# Patient Record
Sex: FEMALE | URBAN - METROPOLITAN AREA
[De-identification: names, ages, dates, MRNs, and addresses within clinical notes are randomized per-mention and may not be internally consistent; named-entity substitution may affect disease eponyms.]

---

## 2017-03-03 ENCOUNTER — APPOINTMENT (OUTPATIENT)
Age: 22
Setting detail: DERMATOLOGY
End: 2017-03-06

## 2017-03-03 DIAGNOSIS — L60.8 OTHER NAIL DISORDERS: ICD-10-CM

## 2017-03-03 PROCEDURE — 99202 OFFICE O/P NEW SF 15 MIN: CPT

## 2017-03-03 PROCEDURE — OTHER COUNSELING: OTHER

## 2017-03-03 PROCEDURE — OTHER OTHER: OTHER

## 2017-03-03 ASSESSMENT — LOCATION DETAILED DESCRIPTION DERM: LOCATION DETAILED: RIGHT RING FINGERNAIL

## 2017-03-03 ASSESSMENT — LOCATION SIMPLE DESCRIPTION DERM: LOCATION SIMPLE: RIGHT RING FINGERNAIL

## 2017-03-03 ASSESSMENT — LOCATION ZONE DERM: LOCATION ZONE: FINGERNAIL

## 2017-03-03 NOTE — PROCEDURE: OTHER
Detail Level: Zone
Note Text (......Xxx Chief Complaint.): This diagnosis correlates with the
Other (Free Text): Refer to Dr. Chavez or Dr. Juan Carlos Lazcano for nail biopsy

## 2017-03-03 NOTE — HPI: NAIL DYSTROPHY
How Severe Is It?: mild
Is This A New Presentation, Or A Follow-Up?: Nail Dystrophy
Additional History: Pt states previous dermatologist Dr. Ann referred her to our office, pt states he wasn't able to do Bx. Patient states it started as thin line a year ago and now is bigger.

## 2017-03-30 ENCOUNTER — APPOINTMENT (OUTPATIENT)
Age: 22
Setting detail: DERMATOLOGY
End: 2017-04-03

## 2017-03-30 DIAGNOSIS — D485 NEOPLASM OF UNCERTAIN BEHAVIOR OF SKIN: ICD-10-CM

## 2017-03-30 PROBLEM — D48.5 NEOPLASM OF UNCERTAIN BEHAVIOR OF SKIN: Status: ACTIVE | Noted: 2017-03-30

## 2017-03-30 PROCEDURE — OTHER OTHER: OTHER

## 2017-03-30 PROCEDURE — OTHER COUNSELING: OTHER

## 2017-03-30 PROCEDURE — OTHER PRESCRIPTION: OTHER

## 2017-03-30 PROCEDURE — 11755 BIOPSY NAIL UNIT: CPT

## 2017-03-30 PROCEDURE — OTHER NAIL UNIT BIOPSY: OTHER

## 2017-03-30 RX ORDER — AZITHROMYCIN DIHYDRATE 250 MG/1
TABLET, FILM COATED ORAL QD
Qty: 1 | Refills: 1 | Status: ERX | COMMUNITY
Start: 2017-03-30

## 2017-03-30 RX ORDER — HYDROCODONE BITATRATE AND ACETAMINOPHEN 5; 325 MG/1; MG/1
TABLET ORAL QD
Qty: 20 | Refills: 0 | COMMUNITY
Start: 2017-03-30

## 2017-03-30 RX ORDER — MUPIROCIN 20 MG/G
OINTMENT TOPICAL BID
Qty: 1 | Refills: 1 | Status: ERX | COMMUNITY
Start: 2017-03-30

## 2017-03-30 ASSESSMENT — LOCATION ZONE DERM: LOCATION ZONE: FINGERNAIL

## 2017-03-30 ASSESSMENT — LOCATION DETAILED DESCRIPTION DERM: LOCATION DETAILED: RIGHT RING FINGERNAIL

## 2017-03-30 ASSESSMENT — LOCATION SIMPLE DESCRIPTION DERM: LOCATION SIMPLE: RIGHT RING FINGERNAIL

## 2017-03-30 NOTE — PROCEDURE: NAIL UNIT BIOPSY
Bill 52039 For Specimen Handling/Conveyance To Laboratory?: no
Biopsy Type: H and E
Nail Matrix Exploration Text: The nail matrix was exposed by making releasing incisions, with a 15 blade scalpel, in the proximal nail fold. The proximal nail fold was then retracted to expose the nail matrix. Following the procedure the nail fold was replaced and sutured.
Pre-Op Text: A tourniquet was applied to the proximal digit and then the site was prepped.
Wound Care: Bacitracin
Post-Care Instructions: I reviewed with the patient in detail post-care instructions. Patient is to keep the biopsy site dry overnight, and then apply bacitracin twice daily until healed. Patient may apply hydrogen peroxide soaks to remove any crusting.
Billing Type: Third-Party Bill
Hemostasis: Electrocautery
X Size Of Lesion In Cm (Optional): 0
Notification Instructions: Patient will be notified of biopsy results. However, patient instructed to call the office if not contacted within 2 weeks.
Reconstruction Of Nail Bed With Graft Text: Following the biopsy the nail bed was repaired with a skin graft.
Anesthesia Volume In Cc: 3
Secondary Intention Text: Following the biopsy the site will heal with secondary intention.
Additional Anesthesia Type: 1% lidocaine with epinephrine and a 1:10 solution of 8.4% sodium bicarbonate
Epidermal Sutures: 4-0 Prolene
Anesthesia Method: Digital block
Partial Removal And Replacement Of Nail Plate Text: The nail plate was partially removed to expose the underlying lesion. The nail plate was then replaced at the end of the procedure.
Consent: Written consent was obtained and risks were reviewed including but not limited to scarring, infection, bleeding, scabbing, incomplete removal, nerve damage and allergy to anesthesia.
Anesthesia Type: 2% lidocaine without epinephrine
Partial Removal Of Nail Plate Text: The nail plate was partially removed to expose the underlying lesion.
Additional Anesthesia Volume In Cc: 1
Nail Avulsion Text: The nail plate was undermined, starting at the distal nail fold,  the nail plate from the nail bed. After detaching the nail plate from the nail bed the nail plate was avulsed.
Repair Type: None, Simple Repair
Partial Nail Avulsion Text: An incision was made in the nail plate. The nail plate was undermined, starting at the distal nail fold,  the nail plate from the nail bed. After detaching the nail plate from the nail bed the a portion of the nail plate was avulsed.
Simple Repair Text: Following the biopsy the surgical site was closed primarily.
Nail Bed Repair Text: Following the biopsy the nail bed was repaired.
Detail Level: Detailed
Biopsy Technique: punch biopsy
Shave Biopsy Method: 15 blade (incisional biopsy technique)
Suture Removal: 7 days
Additional Anesthesia Method: local infiltration

## 2017-03-30 NOTE — PROCEDURE: OTHER
Other (Free Text): PATIENT WITH CHANGING PIGMENTED BAND OF ONE NAIL UNIT. \\nSHE DESCRIBES THE BAND AS DARKENING ASYMMETRICALLY AND SHE BELIEVES IT IS INCREASING IN WIDTH\\n\\nPATIENT HAS BEEN TRAVELING THE GLOBE AND HAS BEEN ADVISED TO HAVE A BIOPSY FOR MANY MONTHS- BUT COULD NOT GET IN UNTIL TODAY.\\n\\nWARNED OF THE RISKS OF DOING A MATRIX BIOPSY - WHERE THE NAIL MAY NOT EVER GROW BACK NORMALLY - THERE IS LIKELY A CHANCE THAT THE NAIL PLATE COULD BE PERMANENTLY SPLIT WITH A RIDGE.  ADVISED HER THAT I WOULD DO THE MATRIX BIOPSY WITHOUT REMOVING THE NAIL PLATE.  ADVISED HER OF THE RISK OF INFECTION - AND SO PATIENT TO USE Z-CAIN (SHE IS PCN ALLERGIC - BUT HAS NO ISSUES WITH AZITHROMYCIN) AND APPLY TOPICAL MUPIROCIN.\\n\\nPAIN MEDS GIVEN PRN PAIN.
Note Text (......Xxx Chief Complaint.): This diagnosis correlates with the
Detail Level: Detailed

## 2017-04-13 ENCOUNTER — APPOINTMENT (OUTPATIENT)
Age: 22
Setting detail: DERMATOLOGY
End: 2017-04-13

## 2017-04-13 DIAGNOSIS — Z48.02 ENCOUNTER FOR REMOVAL OF SUTURES: ICD-10-CM

## 2017-04-13 PROCEDURE — OTHER SUTURE REMOVAL (GLOBAL PERIOD): OTHER

## 2017-04-13 PROCEDURE — OTHER COUNSELING: OTHER

## 2017-04-13 PROCEDURE — OTHER PRESCRIPTION: OTHER

## 2017-04-13 RX ORDER — MUPIROCIN 20 MG/G
OINTMENT TOPICAL BID
Qty: 1 | Refills: 0 | Status: ERX

## 2017-04-13 ASSESSMENT — LOCATION SIMPLE DESCRIPTION DERM: LOCATION SIMPLE: RIGHT RING FINGERNAIL

## 2017-04-13 ASSESSMENT — LOCATION DETAILED DESCRIPTION DERM: LOCATION DETAILED: RIGHT RING FINGERNAIL

## 2017-04-13 ASSESSMENT — LOCATION ZONE DERM: LOCATION ZONE: FINGERNAIL

## 2017-04-13 NOTE — PROCEDURE: SUTURE REMOVAL (GLOBAL PERIOD)
Detail Level: Detailed
Add 97375 Cpt? (Important Note: In 2017 The Use Of 29281 Is Being Tracked By Cms To Determine Future Global Period Reimbursement For Global Periods): no